# Patient Record
Sex: FEMALE | Race: BLACK OR AFRICAN AMERICAN | NOT HISPANIC OR LATINO
[De-identification: names, ages, dates, MRNs, and addresses within clinical notes are randomized per-mention and may not be internally consistent; named-entity substitution may affect disease eponyms.]

---

## 2020-10-05 ENCOUNTER — APPOINTMENT (OUTPATIENT)
Dept: ORTHOPEDIC SURGERY | Facility: CLINIC | Age: 60
End: 2020-10-05

## 2021-07-06 ENCOUNTER — APPOINTMENT (OUTPATIENT)
Dept: OTOLARYNGOLOGY | Facility: CLINIC | Age: 61
End: 2021-07-06
Payer: COMMERCIAL

## 2021-07-06 VITALS
BODY MASS INDEX: 32.96 KG/M2 | DIASTOLIC BLOOD PRESSURE: 80 MMHG | WEIGHT: 210 LBS | SYSTOLIC BLOOD PRESSURE: 130 MMHG | OXYGEN SATURATION: 98 % | HEART RATE: 90 BPM | TEMPERATURE: 97.2 F | HEIGHT: 67 IN

## 2021-07-06 DIAGNOSIS — I10 ESSENTIAL (PRIMARY) HYPERTENSION: ICD-10-CM

## 2021-07-06 DIAGNOSIS — Z78.9 OTHER SPECIFIED HEALTH STATUS: ICD-10-CM

## 2021-07-06 DIAGNOSIS — Z83.3 FAMILY HISTORY OF DIABETES MELLITUS: ICD-10-CM

## 2021-07-06 DIAGNOSIS — J01.90 ACUTE SINUSITIS, UNSPECIFIED: ICD-10-CM

## 2021-07-06 PROBLEM — Z00.00 ENCOUNTER FOR PREVENTIVE HEALTH EXAMINATION: Status: ACTIVE | Noted: 2021-07-06

## 2021-07-06 PROCEDURE — 99204 OFFICE O/P NEW MOD 45 MIN: CPT | Mod: 25

## 2021-07-06 PROCEDURE — 31231 NASAL ENDOSCOPY DX: CPT

## 2021-07-06 RX ORDER — FLUTICASONE PROPIONATE 50 UG/1
50 SPRAY, METERED NASAL
Qty: 16 | Refills: 0 | Status: ACTIVE | COMMUNITY
Start: 2021-05-21

## 2021-07-06 RX ORDER — METOPROLOL TARTRATE 50 MG/1
50 TABLET, FILM COATED ORAL
Qty: 90 | Refills: 0 | Status: ACTIVE | COMMUNITY
Start: 2020-11-03

## 2021-07-06 RX ORDER — AMLODIPINE BESYLATE AND BENAZEPRIL HYDROCHLORIDE 5; 20 MG/1; MG/1
5-20 CAPSULE ORAL
Qty: 90 | Refills: 0 | Status: ACTIVE | COMMUNITY
Start: 2021-04-02

## 2021-07-06 NOTE — CONSULT LETTER
[Dear  ___] : Dear  [unfilled], [( Thank you for referring [unfilled] for consultation for _____ )] : Thank you for referring [unfilled] for consultation for [unfilled] [Please see my note below.] : Please see my note below. [Consult Closing:] : Thank you very much for allowing me to participate in the care of this patient.  If you have any questions, please do not hesitate to contact me. [Sincerely,] : Sincerely, [FreeTextEntry2] : Uri Pacheco MD ([place], [place])\par  [FreeTextEntry3] : Eugenio Horn MD\par Sinus & Endoscopic Skull Base Surgery\par Department of Otolaryngology- Head & Neck Surgery\par [facility]\par [facility]\par \par Phone: [phone]\par Fax: [phone]\par

## 2021-07-06 NOTE — HISTORY OF PRESENT ILLNESS
[Clear Rhinorrhea] : clear rhinorrhea [Purulent Rhinorrhea] : purulent rhinorrhea [Nasal Congestion] : nasal congestion [Postnasal Drainage] : postnasal drainage [de-identified] : Ms. DOBSON is a 61 year female who presents for evaluation of persistent sinusitis\par Referred by Dr. Uri Pacheco\par Had L maxillary dental extraction in October 2020-- since then has experienced persistent L-sided congestion, sinonasal symptoms including discolored drainage and PND, facial pain\par She has been on several PO abx, most recently 2 weeks ago-- symps tend to improve but quickly recur after completion of antibiotics\par Underwent CT ([facility])-- I reviewed personally-- demonstrates completely opacification of L max, ethmoid, frontal with heterogeneous component in maxillary-- also bone defect between L max and oral cavity\par Pt denies any drainage from L maxillary region\par \par PMH: HTN\par Meds: amlodipine, metoprolol, no AC [Facial Pain] : no facial pain [Headache] : no headache [Facial Pressure] : no facial pressure

## 2021-07-06 NOTE — REVIEW OF SYSTEMS
[As Noted in HPI] : as noted in HPI [Nasal Congestion] : nasal congestion [Discolored Nasal Discharge] : discolored nasal discharge [Negative] : Heme/Lymph [Sinus Pain] : no sinus pain

## 2021-07-06 NOTE — DATA REVIEWED
[de-identified] : EXAM: CT-MAXIFACIAL SINUS NON CONTRAST\par \par HISTORY: J32.9 Recurrent sinusitis R09.81 Nasal congestion\par J32.0 Chronic maxillary sinusitis\par \par \par TECHNIQUE: Axial images through the face/sinuses was obtained. Thin section\par coronal and sagittal reformations were performed.\par This study was performed using automatic exposure control and an iterative\par reconstruction technique (radiation dose reduction software) to obtain a\par diagnostic image quality scan with patient dose as low as reasonably achievable.\par The mA and kV were adjusted according to patient size. The administered\par radiation dose was .9mSv.\par \par COMPARISON: None.\par \par FINDINGS:\par There is complete opacification of the left frontal sinus, anterior left ethmoid\par air cells and there is opacification of the left maxillary sinus extending into,\par obstructing and expanding the left ostiomeatal unit and left middle meatus.\par There is right nasal septum deviation. There are chris bullosa of the middle\par turbinates. There is mild polypoid mucosal thickening of the right ostiomeatal\par unit. The right ostiomeatal unit is patent but remains narrowed. The right\par frontal sinus and right ethmoid air cells are clear. Sphenoid sinuses are clear.\par Sphenoid sinus ostia are obstructed by soft tissue mucosal thickening.\par Mastoid air cells are partially opacified on the left. Right mastoid air cells\par are clear. No suspicious focal osseous abnormality. The orbital soft tissues are\par normal. Limited evaluation of the intracranial structures is grossly normal..\par \par IMPRESSION:\par \par Complete opacification of the left frontal sinus and left ethmoid air cells.\par Opacification of the left maxillary sinus extending into and obstructing and\par expanding the left ostiomeatal unit and left middle meatus. Right nasal septum\par deviation.\par \par ICD 10 -\par \par \par Signed by: Maddison Foss MD\par Signed Date: 6/11/2021 10:30 AM EDT

## 2021-07-12 LAB — BACTERIA FLD CULT: ABNORMAL

## 2021-08-02 ENCOUNTER — OUTPATIENT (OUTPATIENT)
Dept: OUTPATIENT SERVICES | Facility: HOSPITAL | Age: 61
LOS: 1 days | End: 2021-08-02
Payer: COMMERCIAL

## 2021-08-02 VITALS
HEIGHT: 67 IN | OXYGEN SATURATION: 99 % | DIASTOLIC BLOOD PRESSURE: 85 MMHG | SYSTOLIC BLOOD PRESSURE: 142 MMHG | RESPIRATION RATE: 16 BRPM | WEIGHT: 220.02 LBS | HEART RATE: 80 BPM | TEMPERATURE: 98 F

## 2021-08-02 DIAGNOSIS — J32.9 CHRONIC SINUSITIS, UNSPECIFIED: ICD-10-CM

## 2021-08-02 DIAGNOSIS — J01.90 ACUTE SINUSITIS, UNSPECIFIED: ICD-10-CM

## 2021-08-02 DIAGNOSIS — Z98.89 OTHER SPECIFIED POSTPROCEDURAL STATES: Chronic | ICD-10-CM

## 2021-08-02 DIAGNOSIS — Z90.49 ACQUIRED ABSENCE OF OTHER SPECIFIED PARTS OF DIGESTIVE TRACT: Chronic | ICD-10-CM

## 2021-08-02 DIAGNOSIS — Z01.818 ENCOUNTER FOR OTHER PREPROCEDURAL EXAMINATION: ICD-10-CM

## 2021-08-02 DIAGNOSIS — I10 ESSENTIAL (PRIMARY) HYPERTENSION: ICD-10-CM

## 2021-08-02 PROCEDURE — G0463: CPT

## 2021-08-02 RX ORDER — SODIUM CHLORIDE 9 MG/ML
3 INJECTION INTRAMUSCULAR; INTRAVENOUS; SUBCUTANEOUS EVERY 8 HOURS
Refills: 0 | Status: DISCONTINUED | OUTPATIENT
Start: 2021-08-16 | End: 2021-08-30

## 2021-08-02 RX ORDER — LIDOCAINE HCL 20 MG/ML
0.2 VIAL (ML) INJECTION ONCE
Refills: 0 | Status: DISCONTINUED | OUTPATIENT
Start: 2021-08-16 | End: 2021-08-30

## 2021-08-02 NOTE — H&P PST ADULT - NSICDXPASTMEDICALHX_GEN_ALL_CORE_FT
PAST MEDICAL HISTORY:  Chronic back pain     Chronic sinusitis     Constipation     Hypertension     Lumbar disc herniation     Shoulder pain, right 2/2020 s/p dislocation  work related injury     38

## 2021-08-02 NOTE — H&P PST ADULT - NSICDXFAMILYHX_GEN_ALL_CORE_FT
FAMILY HISTORY:  Family history of Alzheimer's disease  Family history of diabetes mellitus    Sibling  Still living? Unknown  Family history of hypertension, Age at diagnosis: Age Unknown

## 2021-08-02 NOTE — H&P PST ADULT - HISTORY OF PRESENT ILLNESS
60 y/o female with h/o HTN, chronic back pain c/o chronic sinusitis and persistent L-sided congestion s/p CT demonstrates completely opacification of L max, ethmoid, frontal with heterogeneous component in maxillary-- also bone defect between L max and oral cavity. Today she presents to PST for scheduled Functional Endoscopic Sinus Surgery Septoplasty Turbinectomy on 8/16/21. Denies any palpitations, SOB, N/V, fever or chills.   *** Pfizer series done 4/2021, vaccination card on file

## 2021-08-02 NOTE — H&P PST ADULT - NSICDXPROBLEM_GEN_ALL_CORE_FT
PROBLEM DIAGNOSES  Problem: Chronic sinusitis  Assessment and Plan:  Functional Endoscopic Sinus Surgery Septoplasty Turbinectomy on 8/16/21  Medical Eval pending  CBC/BMP/Liver panel done with PCP requested    Problem: Hypertension  Assessment and Plan: continue on antihypertensive medications

## 2021-08-02 NOTE — H&P PST ADULT - NSICDXPASTSURGICALHX_GEN_ALL_CORE_FT
PAST SURGICAL HISTORY:  History of cholecystectomy 2016    History of incision and drainage 2016 Liver abscess

## 2021-08-02 NOTE — H&P PST ADULT - MUSCULOSKELETAL
no joint swelling/no joint erythema/no joint warmth/no calf tenderness/normal strength/decreased ROM due to pain details… detailed exam

## 2021-08-09 PROBLEM — M25.511 PAIN IN RIGHT SHOULDER: Chronic | Status: ACTIVE | Noted: 2021-08-02

## 2021-08-09 PROBLEM — J32.9 CHRONIC SINUSITIS, UNSPECIFIED: Chronic | Status: ACTIVE | Noted: 2021-08-02

## 2021-08-09 PROBLEM — M51.26 OTHER INTERVERTEBRAL DISC DISPLACEMENT, LUMBAR REGION: Chronic | Status: ACTIVE | Noted: 2021-08-02

## 2021-08-09 PROBLEM — M54.9 DORSALGIA, UNSPECIFIED: Chronic | Status: ACTIVE | Noted: 2021-08-02

## 2021-08-12 DIAGNOSIS — Z01.818 ENCOUNTER FOR OTHER PREPROCEDURAL EXAMINATION: ICD-10-CM

## 2021-08-13 ENCOUNTER — APPOINTMENT (OUTPATIENT)
Dept: DISASTER EMERGENCY | Facility: CLINIC | Age: 61
End: 2021-08-13

## 2021-08-14 LAB — SARS-COV-2 N GENE NPH QL NAA+PROBE: NOT DETECTED

## 2021-08-15 ENCOUNTER — TRANSCRIPTION ENCOUNTER (OUTPATIENT)
Age: 61
End: 2021-08-15

## 2021-08-15 RX ORDER — SODIUM CHLORIDE 9 MG/ML
1000 INJECTION, SOLUTION INTRAVENOUS
Refills: 0 | Status: DISCONTINUED | OUTPATIENT
Start: 2021-08-16 | End: 2021-08-30

## 2021-08-15 RX ORDER — ONDANSETRON 8 MG/1
4 TABLET, FILM COATED ORAL ONCE
Refills: 0 | Status: DISCONTINUED | OUTPATIENT
Start: 2021-08-16 | End: 2021-08-30

## 2021-08-15 RX ORDER — OXYCODONE HYDROCHLORIDE 5 MG/1
5 TABLET ORAL ONCE
Refills: 0 | Status: DISCONTINUED | OUTPATIENT
Start: 2021-08-16 | End: 2021-08-16

## 2021-08-16 ENCOUNTER — RESULT REVIEW (OUTPATIENT)
Age: 61
End: 2021-08-16

## 2021-08-16 ENCOUNTER — OUTPATIENT (OUTPATIENT)
Dept: OUTPATIENT SERVICES | Facility: HOSPITAL | Age: 61
LOS: 1 days | End: 2021-08-16
Payer: COMMERCIAL

## 2021-08-16 ENCOUNTER — APPOINTMENT (OUTPATIENT)
Dept: OTOLARYNGOLOGY | Facility: HOSPITAL | Age: 61
End: 2021-08-16

## 2021-08-16 VITALS
RESPIRATION RATE: 14 BRPM | OXYGEN SATURATION: 97 % | HEART RATE: 64 BPM | WEIGHT: 220.02 LBS | DIASTOLIC BLOOD PRESSURE: 77 MMHG | TEMPERATURE: 98 F | SYSTOLIC BLOOD PRESSURE: 126 MMHG | HEIGHT: 67 IN

## 2021-08-16 VITALS
RESPIRATION RATE: 16 BRPM | DIASTOLIC BLOOD PRESSURE: 66 MMHG | HEART RATE: 71 BPM | TEMPERATURE: 98 F | OXYGEN SATURATION: 96 % | SYSTOLIC BLOOD PRESSURE: 128 MMHG

## 2021-08-16 DIAGNOSIS — J01.90 ACUTE SINUSITIS, UNSPECIFIED: ICD-10-CM

## 2021-08-16 DIAGNOSIS — Z90.49 ACQUIRED ABSENCE OF OTHER SPECIFIED PARTS OF DIGESTIVE TRACT: Chronic | ICD-10-CM

## 2021-08-16 DIAGNOSIS — Z01.818 ENCOUNTER FOR OTHER PREPROCEDURAL EXAMINATION: ICD-10-CM

## 2021-08-16 DIAGNOSIS — J32.9 CHRONIC SINUSITIS, UNSPECIFIED: ICD-10-CM

## 2021-08-16 DIAGNOSIS — Z98.89 OTHER SPECIFIED POSTPROCEDURAL STATES: Chronic | ICD-10-CM

## 2021-08-16 LAB
GRAM STN FLD: SIGNIFICANT CHANGE UP
SPECIMEN SOURCE: SIGNIFICANT CHANGE UP

## 2021-08-16 PROCEDURE — 87075 CULTR BACTERIA EXCEPT BLOOD: CPT

## 2021-08-16 PROCEDURE — 87077 CULTURE AEROBIC IDENTIFY: CPT

## 2021-08-16 PROCEDURE — 87186 SC STD MICRODIL/AGAR DIL: CPT

## 2021-08-16 PROCEDURE — 31267 ENDOSCOPY MAXILLARY SINUS: CPT | Mod: LT

## 2021-08-16 PROCEDURE — 88311 DECALCIFY TISSUE: CPT | Mod: 26

## 2021-08-16 PROCEDURE — 88311 DECALCIFY TISSUE: CPT

## 2021-08-16 PROCEDURE — 88305 TISSUE EXAM BY PATHOLOGIST: CPT | Mod: 26

## 2021-08-16 PROCEDURE — C2625: CPT

## 2021-08-16 PROCEDURE — 87102 FUNGUS ISOLATION CULTURE: CPT

## 2021-08-16 PROCEDURE — 31276 NSL/SINS NDSC FRNT TISS RMVL: CPT | Mod: LT

## 2021-08-16 PROCEDURE — 31259 NSL/SINS NDSC SPHN TISS RMVL: CPT

## 2021-08-16 PROCEDURE — 61782 SCAN PROC CRANIAL EXTRA: CPT

## 2021-08-16 PROCEDURE — C9399: CPT

## 2021-08-16 PROCEDURE — 87070 CULTURE OTHR SPECIMN AEROBIC: CPT

## 2021-08-16 PROCEDURE — 88305 TISSUE EXAM BY PATHOLOGIST: CPT

## 2021-08-16 PROCEDURE — 87205 SMEAR GRAM STAIN: CPT

## 2021-08-16 PROCEDURE — 31288 NASAL/SINUS ENDOSCOPY SURG: CPT | Mod: LT

## 2021-08-16 PROCEDURE — 31253 NSL/SINS NDSC TOTAL: CPT | Mod: LT

## 2021-08-16 PROCEDURE — C1889: CPT

## 2021-08-16 RX ORDER — MULTIVIT-MIN/FERROUS GLUCONATE 9 MG/15 ML
1 LIQUID (ML) ORAL
Qty: 0 | Refills: 0 | DISCHARGE

## 2021-08-16 RX ORDER — MELOXICAM 15 MG/1
1 TABLET ORAL
Qty: 0 | Refills: 0 | DISCHARGE

## 2021-08-16 RX ORDER — AMLODIPINE BESYLATE AND BENAZEPRIL HYDROCHLORIDE 10; 20 MG/1; MG/1
1 CAPSULE ORAL
Qty: 0 | Refills: 0 | DISCHARGE

## 2021-08-16 RX ORDER — METOPROLOL TARTRATE 50 MG
1 TABLET ORAL
Qty: 0 | Refills: 0 | DISCHARGE

## 2021-08-16 NOTE — ASU PATIENT PROFILE, ADULT - HARM RISK FACTORS
no dvt ppx: xarelto  diet: DASH  dispo: pt recs [facility]     Transitions of Care Status:  1.  Name of PCP:  2.  PCP Contacted on Admission: [ ] Y    [x ] N  - evening admission   3.  PCP contacted at Discharge: [ ] Y    [ ] N    [ ] N/A  4.  Post-Discharge Appointment Date and Location:  5.  Summary of Handoff given to PCP:

## 2021-08-16 NOTE — ASU PREOP CHECKLIST - LAST DOSE WITHIN LAST 24HRS
Luis M has been informed. Nurse visit scheduled for his stelara injection.  
leelee PALMA forms   2     Iza Torres <[email or web address]>     Reply all  Today, 2:32 PM  Elizabet cMneal,     We received approval for the Stelara for 45 mg/45 units every 12 weeks from 1/9/17 - 1/9/18!     Hope you ve had a great day,     Iza  
Yes

## 2021-08-16 NOTE — ASU PATIENT PROFILE, ADULT - NSICDXPASTMEDICALHX_GEN_ALL_CORE_FT
PAST MEDICAL HISTORY:  Chronic back pain     Chronic sinusitis     Constipation     Hypertension     Lumbar disc herniation     Shoulder pain, right 2/2020 s/p dislocation  work related injury

## 2021-08-16 NOTE — ASU DISCHARGE PLAN (ADULT/PEDIATRIC) - CARE PROVIDER_API CALL
Eugenio Horn (MD)  Otolaryngology  [address] [facility], [address] [place] [address]  Phone: [phone]  Fax: [phone]  Follow Up Time:

## 2021-08-18 LAB
CULTURE RESULTS: SIGNIFICANT CHANGE UP
SPECIMEN SOURCE: SIGNIFICANT CHANGE UP
SURGICAL PATHOLOGY STUDY: SIGNIFICANT CHANGE UP

## 2021-08-19 LAB
-  AMPICILLIN/SULBACTAM: SIGNIFICANT CHANGE UP
-  AMPICILLIN/SULBACTAM: SIGNIFICANT CHANGE UP
-  CEFAZOLIN: SIGNIFICANT CHANGE UP
-  CEFAZOLIN: SIGNIFICANT CHANGE UP
-  CLINDAMYCIN: SIGNIFICANT CHANGE UP
-  CLINDAMYCIN: SIGNIFICANT CHANGE UP
-  ERYTHROMYCIN: SIGNIFICANT CHANGE UP
-  ERYTHROMYCIN: SIGNIFICANT CHANGE UP
-  GENTAMICIN: SIGNIFICANT CHANGE UP
-  GENTAMICIN: SIGNIFICANT CHANGE UP
-  OXACILLIN: SIGNIFICANT CHANGE UP
-  OXACILLIN: SIGNIFICANT CHANGE UP
-  PENICILLIN: SIGNIFICANT CHANGE UP
-  PENICILLIN: SIGNIFICANT CHANGE UP
-  RIFAMPIN: SIGNIFICANT CHANGE UP
-  RIFAMPIN: SIGNIFICANT CHANGE UP
-  TETRACYCLINE: SIGNIFICANT CHANGE UP
-  TETRACYCLINE: SIGNIFICANT CHANGE UP
-  TRIMETHOPRIM/SULFAMETHOXAZOLE: SIGNIFICANT CHANGE UP
-  TRIMETHOPRIM/SULFAMETHOXAZOLE: SIGNIFICANT CHANGE UP
-  VANCOMYCIN: SIGNIFICANT CHANGE UP
-  VANCOMYCIN: SIGNIFICANT CHANGE UP
CULTURE RESULTS: SIGNIFICANT CHANGE UP
METHOD TYPE: SIGNIFICANT CHANGE UP
METHOD TYPE: SIGNIFICANT CHANGE UP
ORGANISM # SPEC MICROSCOPIC CNT: SIGNIFICANT CHANGE UP

## 2021-08-25 ENCOUNTER — APPOINTMENT (OUTPATIENT)
Dept: OTOLARYNGOLOGY | Facility: CLINIC | Age: 61
End: 2021-08-25
Payer: COMMERCIAL

## 2021-08-25 VITALS
HEIGHT: 67 IN | SYSTOLIC BLOOD PRESSURE: 98 MMHG | BODY MASS INDEX: 32.96 KG/M2 | HEART RATE: 72 BPM | DIASTOLIC BLOOD PRESSURE: 62 MMHG | WEIGHT: 210 LBS

## 2021-08-25 PROCEDURE — 31237 NSL/SINS NDSC SURG BX POLYPC: CPT | Mod: 50

## 2021-08-25 PROCEDURE — 99213 OFFICE O/P EST LOW 20 MIN: CPT | Mod: 25

## 2021-08-26 NOTE — HISTORY OF PRESENT ILLNESS
[de-identified] : 61 year old female hx left chronic sinusitis, left maxillary mycetoma \par s/p ESS 08/16/21 \par NATURE OF OPERATIONS:\par 1. Left endoscopic maxillary antrostomy with tissue removal\par 2. Left endoscopic total ethmoidectomy\par 3. Left endoscopic sphenoidotomy with tissue removal\par 4. Left endoscopic frontal sinusotomy with tissue removal\par 5. Use of stereotactic image navigation, extradural.\par USing saline rinse 2x a day and saline mist 4x a day \par \par Appropriate post-op pain and congestion\par Reports sense of smell is improving. \par Using nasal saline\par Denies recent fevers or infections post op

## 2021-08-26 NOTE — REASON FOR VISIT
[Subsequent Evaluation] : a subsequent evaluation for [FreeTextEntry2] : left chronic sinusitis, left maxillary mycetoma , s/p ESS 08/16/21

## 2021-09-15 LAB
CULTURE RESULTS: SIGNIFICANT CHANGE UP
SPECIMEN SOURCE: SIGNIFICANT CHANGE UP

## 2021-09-22 ENCOUNTER — APPOINTMENT (OUTPATIENT)
Dept: OTOLARYNGOLOGY | Facility: CLINIC | Age: 61
End: 2021-09-22

## 2021-11-10 ENCOUNTER — APPOINTMENT (OUTPATIENT)
Dept: OTOLARYNGOLOGY | Facility: CLINIC | Age: 61
End: 2021-11-10
Payer: COMMERCIAL

## 2021-11-10 VITALS
DIASTOLIC BLOOD PRESSURE: 78 MMHG | BODY MASS INDEX: 32.49 KG/M2 | WEIGHT: 207 LBS | HEIGHT: 67 IN | SYSTOLIC BLOOD PRESSURE: 120 MMHG | HEART RATE: 66 BPM

## 2021-11-10 DIAGNOSIS — J32.9 CHRONIC SINUSITIS, UNSPECIFIED: ICD-10-CM

## 2021-11-10 PROCEDURE — 99214 OFFICE O/P EST MOD 30 MIN: CPT | Mod: 25

## 2021-11-10 PROCEDURE — 31237 NSL/SINS NDSC SURG BX POLYPC: CPT | Mod: 50

## 2021-11-10 RX ORDER — DOXYCYCLINE HYCLATE 100 MG/1
100 CAPSULE ORAL
Qty: 14 | Refills: 0 | Status: ACTIVE | COMMUNITY
Start: 2021-11-10 | End: 1900-01-01

## 2021-11-10 RX ORDER — OXYCODONE AND ACETAMINOPHEN 5; 325 MG/1; MG/1
5-325 TABLET ORAL
Qty: 12 | Refills: 0 | Status: DISCONTINUED | COMMUNITY
Start: 2021-08-15 | End: 2021-11-10

## 2021-11-10 RX ORDER — TETRACYCLINE HYDROCHLORIDE 500 MG/1
500 CAPSULE ORAL EVERY 6 HOURS
Qty: 28 | Refills: 0 | Status: DISCONTINUED | COMMUNITY
Start: 2021-07-12 | End: 2021-11-10

## 2021-11-10 RX ORDER — METHYLPREDNISOLONE 4 MG/1
4 TABLET ORAL
Qty: 1 | Refills: 0 | Status: DISCONTINUED | COMMUNITY
Start: 2021-08-16 | End: 2021-11-10

## 2021-11-10 NOTE — HISTORY OF PRESENT ILLNESS
[de-identified] : 61 year old female hx left chronic sinusitis, left maxillary mycetoma s/p ESS 08/16/21 \par LCV 8/25\par Missed scheduled f/u\par Overall feels much improved compared to preop but w intermittent anterior rhinorrhea, congestoin L>R\par Using saline rinses 3x a day\par Denies facial pain/pressure, epistaxis\par States sense of smell is good\par Using flonase once a day \par Denies recent fevers or sinus infections \par \par NATURE OF OPERATIONS:\par 1. Left endoscopic maxillary antrostomy with tissue removal\par 2. Left endoscopic total ethmoidectomy\par 3. Left endoscopic sphenoidotomy with tissue removal\par 4. Left endoscopic frontal sinusotomy with tissue removal\par 5. Use of stereotactic image navigation, extradural.

## 2021-11-10 NOTE — REASON FOR VISIT
[Subsequent Evaluation] : a subsequent evaluation for [FreeTextEntry2] : left chronic sinusitis, left maxillary mycetoma , s/p ESS 08/16/21.

## 2021-11-18 ENCOUNTER — RESULT REVIEW (OUTPATIENT)
Age: 61
End: 2021-11-18
